# Patient Record
Sex: MALE | Race: WHITE
[De-identification: names, ages, dates, MRNs, and addresses within clinical notes are randomized per-mention and may not be internally consistent; named-entity substitution may affect disease eponyms.]

---

## 2022-07-08 ENCOUNTER — DASHBOARD ENCOUNTERS (OUTPATIENT)
Age: 27
End: 2022-07-08

## 2022-07-08 ENCOUNTER — OFFICE VISIT (OUTPATIENT)
Dept: URBAN - METROPOLITAN AREA CLINIC 54 | Facility: CLINIC | Age: 27
End: 2022-07-08
Payer: COMMERCIAL

## 2022-07-08 ENCOUNTER — WEB ENCOUNTER (OUTPATIENT)
Dept: URBAN - METROPOLITAN AREA CLINIC 54 | Facility: CLINIC | Age: 27
End: 2022-07-08

## 2022-07-08 VITALS
DIASTOLIC BLOOD PRESSURE: 75 MMHG | SYSTOLIC BLOOD PRESSURE: 141 MMHG | WEIGHT: 206.2 LBS | TEMPERATURE: 98.2 F | HEART RATE: 69 BPM | BODY MASS INDEX: 28.87 KG/M2 | HEIGHT: 71 IN

## 2022-07-08 DIAGNOSIS — K21.9 GASTROESOPHAGEAL REFLUX DISEASE, UNSPECIFIED WHETHER ESOPHAGITIS PRESENT: ICD-10-CM

## 2022-07-08 DIAGNOSIS — R10.13 EPIGASTRIC PAIN: ICD-10-CM

## 2022-07-08 DIAGNOSIS — R10.11 RUQ PAIN: ICD-10-CM

## 2022-07-08 PROCEDURE — 99204 OFFICE O/P NEW MOD 45 MIN: CPT | Performed by: INTERNAL MEDICINE

## 2022-07-08 RX ORDER — OMEPRAZOLE 20 MG/1
1 CAPSULE 30 MINUTES BEFORE MORNING MEAL CAPSULE, DELAYED RELEASE ORAL ONCE A DAY
Qty: 30 | Refills: 2 | OUTPATIENT
Start: 2022-07-08

## 2022-07-08 NOTE — HPI-TODAY'S VISIT:
26-year-old man with no significant past medical history reports chronic right upper quadrant abdominal pain.  Reports its positional that improves with sitting and worsens with standing.  He denies it is postprandial or associated with bowel movements or gas.  His weight is stable.  He has no nausea or vomiting with the right upper quadrant pain.  He does report a history of longtime reflux.  He has had reflux for 3 years with mostly daytime symptoms.  He has reflux daily and takes Tums daily.  Patient reports right upper quadrant discomfort is worsened after he binges on alcohol.  He has 24 hours of severe discomfort of his right upper quadrant after alcohol use.  Patient drinks 10-12 beers per weekend.  Patient was a past tobacco user.  Patient denies any prior upper endoscopy or imaging.

## 2022-07-10 LAB
A/G RATIO: 2.6
ALBUMIN: 5.1
ALKALINE PHOSPHATASE: 83
ALT (SGPT): 24
AST (SGOT): 21
BILIRUBIN, TOTAL: 1.7
BUN/CREATININE RATIO: 18
BUN: 22
CALCIUM: 10
CARBON DIOXIDE, TOTAL: 24
CHLORIDE: 100
CREATININE: 1.24
EGFR: 83
GGT: 18
GLOBULIN, TOTAL: 2
GLUCOSE: 92
HEMATOCRIT: 49.5
HEMOGLOBIN: 15.9
MCH: 30.1
MCHC: 32.1
MCV: 94
NRBC: (no result)
PLATELETS: 178
POTASSIUM: 4.3
PROTEIN, TOTAL: 7.1
RBC: 5.28
RDW: 12.5
SODIUM: 139
WBC: 5.5

## 2022-07-12 ENCOUNTER — TELEPHONE ENCOUNTER (OUTPATIENT)
Dept: URBAN - METROPOLITAN AREA CLINIC 54 | Facility: CLINIC | Age: 27
End: 2022-07-12

## 2022-07-12 PROBLEM — 14783006: Status: ACTIVE | Noted: 2022-07-12

## 2022-07-20 ENCOUNTER — OFFICE VISIT (OUTPATIENT)
Dept: URBAN - METROPOLITAN AREA SURGERY CENTER 14 | Facility: SURGERY CENTER | Age: 27
End: 2022-07-20

## 2022-07-20 PROBLEM — 235595009: Status: ACTIVE | Noted: 2022-07-08

## 2022-08-10 ENCOUNTER — OFFICE VISIT (OUTPATIENT)
Dept: URBAN - METROPOLITAN AREA SURGERY CENTER 14 | Facility: SURGERY CENTER | Age: 27
End: 2022-08-10
Payer: COMMERCIAL

## 2022-08-10 ENCOUNTER — LAB OUTSIDE AN ENCOUNTER (OUTPATIENT)
Dept: URBAN - METROPOLITAN AREA CLINIC 54 | Facility: CLINIC | Age: 27
End: 2022-08-10

## 2022-08-10 DIAGNOSIS — R10.13 ABDOMINAL DISCOMFORT, EPIGASTRIC: ICD-10-CM

## 2022-08-10 DIAGNOSIS — K29.60 ADENOPAPILLOMATOSIS GASTRICA: ICD-10-CM

## 2022-08-10 PROCEDURE — G8907 PT DOC NO EVENTS ON DISCHARG: HCPCS | Performed by: INTERNAL MEDICINE

## 2022-08-10 PROCEDURE — 43239 EGD BIOPSY SINGLE/MULTIPLE: CPT | Performed by: INTERNAL MEDICINE

## 2022-08-11 LAB
BILIRUBIN, DIRECT: 0.3
BILIRUBIN, TOTAL: 1.5

## 2022-09-29 ENCOUNTER — WEB ENCOUNTER (OUTPATIENT)
Dept: URBAN - METROPOLITAN AREA CLINIC 54 | Facility: CLINIC | Age: 27
End: 2022-09-29

## 2022-09-29 ENCOUNTER — TELEPHONE ENCOUNTER (OUTPATIENT)
Dept: URBAN - METROPOLITAN AREA CLINIC 54 | Facility: CLINIC | Age: 27
End: 2022-09-29

## 2022-09-29 RX ORDER — OMEPRAZOLE 20 MG/1
1 CAPSULE 30 MINUTES BEFORE MORNING MEAL CAPSULE, DELAYED RELEASE ORAL ONCE A DAY
Qty: 30 | Refills: 2
Start: 2022-07-08

## 2023-01-09 ENCOUNTER — WEB ENCOUNTER (OUTPATIENT)
Dept: URBAN - METROPOLITAN AREA CLINIC 54 | Facility: CLINIC | Age: 28
End: 2023-01-09

## 2023-01-09 RX ORDER — OMEPRAZOLE 20 MG/1
1 CAPSULE 30 MINUTES BEFORE MORNING MEAL CAPSULE, DELAYED RELEASE ORAL ONCE A DAY
Qty: 30 | Refills: 6

## 2024-02-01 NOTE — PHYSICAL EXAM GASTROINTESTINAL
Abdomen , soft, nontender, nondistended , no guarding or rigidity , no masses palpable , normal bowel sounds , Liver and Spleen , no hepatomegaly present , no hepatosplenomegaly , liver nontender , spleen not palpable
Yes...